# Patient Record
Sex: MALE | Race: WHITE
[De-identification: names, ages, dates, MRNs, and addresses within clinical notes are randomized per-mention and may not be internally consistent; named-entity substitution may affect disease eponyms.]

---

## 2019-02-08 ENCOUNTER — HOSPITAL ENCOUNTER (EMERGENCY)
Dept: HOSPITAL 7 - FB.ED | Age: 39
Discharge: HOME | End: 2019-02-08
Payer: COMMERCIAL

## 2019-02-08 VITALS — SYSTOLIC BLOOD PRESSURE: 158 MMHG | DIASTOLIC BLOOD PRESSURE: 76 MMHG

## 2019-02-08 DIAGNOSIS — S54.02XD: ICD-10-CM

## 2019-02-08 DIAGNOSIS — E87.6: ICD-10-CM

## 2019-02-08 DIAGNOSIS — I10: ICD-10-CM
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
## 2019-02-08 DIAGNOSIS — Z79.899: ICD-10-CM

## 2019-02-08 DIAGNOSIS — Z88.0: ICD-10-CM

## 2019-02-08 DIAGNOSIS — R55: Primary | ICD-10-CM

## 2019-02-08 DIAGNOSIS — E66.9: ICD-10-CM

## 2019-02-08 DIAGNOSIS — G47.39: ICD-10-CM

## 2019-02-08 LAB — HBA1C BLD-MCNC: 5.9 % (ref 4.5–6.2)

## 2019-02-08 PROCEDURE — 81001 URINALYSIS AUTO W/SCOPE: CPT

## 2019-02-08 PROCEDURE — 82150 ASSAY OF AMYLASE: CPT

## 2019-02-08 PROCEDURE — 70450 CT HEAD/BRAIN W/O DYE: CPT

## 2019-02-08 PROCEDURE — 84484 ASSAY OF TROPONIN QUANT: CPT

## 2019-02-08 PROCEDURE — G0480 DRUG TEST DEF 1-7 CLASSES: HCPCS

## 2019-02-08 PROCEDURE — 85610 PROTHROMBIN TIME: CPT

## 2019-02-08 PROCEDURE — 80305 DRUG TEST PRSMV DIR OPT OBS: CPT

## 2019-02-08 PROCEDURE — 80048 BASIC METABOLIC PNL TOTAL CA: CPT

## 2019-02-08 PROCEDURE — 80076 HEPATIC FUNCTION PANEL: CPT

## 2019-02-08 PROCEDURE — 85025 COMPLETE CBC W/AUTO DIFF WBC: CPT

## 2019-02-08 PROCEDURE — 82962 GLUCOSE BLOOD TEST: CPT

## 2019-02-08 PROCEDURE — 99285 EMERGENCY DEPT VISIT HI MDM: CPT

## 2019-02-08 PROCEDURE — 36415 COLL VENOUS BLD VENIPUNCTURE: CPT

## 2019-02-08 PROCEDURE — 83735 ASSAY OF MAGNESIUM: CPT

## 2019-02-08 PROCEDURE — 93005 ELECTROCARDIOGRAM TRACING: CPT

## 2019-02-08 PROCEDURE — 83036 HEMOGLOBIN GLYCOSYLATED A1C: CPT

## 2019-02-08 NOTE — EDM.PDOC
ED HPI GENERAL MEDICAL PROBLEM





- General


Stated Complaint: dizziness,facial numbness


Time Seen by Provider: 02/08/19 14:11


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


History Limitations: Reports: Other (tired)





- History of Present Illness


INITIAL COMMENTS - FREE TEXT/NARRATIVE: 





38 y.o.w.m.morbid obese -360 lbs est- came with his  to the 

ed after he nearly past out about one hour after lunch, at work. Pt stated, he 

does not have good night sleeps, because he wake up 4-5 times at night, due to 

numbness at his left arm, S/P ulnar nerve relocation at the  elbow. His 

girlfriend told him he is snoring really loud at night. He did not have a sleep 

study in the past. No trauma, his favored food is pasta, No C/P no N/V/D  No 

diaphoresis. No other acute medical issues. /74 Pulse 82 RR 17 Pulse ox 97

% on RA Temp 36.4


Onset Date: 02/08/19


Onset Time: 13:00


Duration: Minutes:, Intermittent


Location: Reports: Generalized


Quality: Reports: Other (numbness left forearm+)


Severity: Moderate


Improves with: Reports: Rest


Worsens with: Reports: Movement


Context: Reports: Other (near syncopy)


Associated Symptoms: Reports: Syncope (extremely tired, lethargic)





- Related Data


 Allergies











Allergy/AdvReac Type Severity Reaction Status Date / Time


 


Penicillins Allergy  unknown Verified 06/03/16 07:18











Home Meds: 


 Home Meds





Losartan/Hydrochlorothiazide [Losartan-HCTZ 50-12.5 MG] 1 each PO DAILY #10 

tablet 02/08/19 [Rx]











Past Medical History


HEENT History: Reports: Impaired Vision


Gastrointestinal History: Reports: GERD


Neurological History: Reports: Headaches, Chronic


Psychiatric History: Reports: Depression


Dermatologic History: Reports: Other (See Below)


Other Dermatologic History: NONTHROMBOCYTOPENIA PURPURAS





- Past Surgical History


GI Surgical History: Reports: Appendectomy





ED ROS GENERAL





- Review of Systems


Review Of Systems: See Below


Constitutional: Reports: Malaise, Weakness


HEENT: Reports: No Symptoms


Respiratory: Reports: No Symptoms


Cardiovascular: Reports: Blood Pressure Problem


Endocrine: Reports: No Symptoms


GI/Abdominal: Reports: No Symptoms


: Reports: No Symptoms


Musculoskeletal: Reports: No Symptoms


Skin: Reports: No Symptoms


Neurological: Reports: Numbness (left forearm finger 4+5)


Psychiatric: Reports: No Symptoms


Hematologic/Lymphatic: Reports: No Symptoms


Immunologic: Reports: No Symptoms





ED EXAM, NEURO





- Physical Exam


Exam: See Below


Exam Limited By: No Limitations


General Appearance: Alert, WD/WN, Mild Distress, Obese (morbid)


Eye Exam: Bilateral Eye: Normal Inspection


Ears: Normal External Exam


Nose: Normal Inspection


Throat/Mouth: Normal Inspection


Head Exam: Atraumatic, Normocephalic


Neck: Normal Inspection, Supple, Non-Tender, Full Range of Motion


Respiratory/Chest: No Respiratory Distress, Lungs Clear, Normal Breath Sounds


Cardiovascular: Normal Peripheral Pulses, Regular Rate, Rhythm, No Edema, No 

Gallop, No JVD, No Murmur, No Rub


GI/Abdominal: Normal Bowel Sounds, Soft, Non-Tender, No Organomegaly, No 

Abnormal Bruit, No Mass, Pelvis Stable


 (Male) Exam: Deferred


Rectal (Males) Exam: Deferred


Neurological: CN II-XII Intact, Normal Plantar Flexion, Oriented x 3, 

Difficulty Walking (because he is too tired)


Back Exam: Normal Inspection, Full Range of Motion


Extremities: Normal Inspection, Normal Range of Motion, Non-Tender, No Pedal 

Edema, Normal Capillary Refill


Psychiatric: Normal Affect, Normal Mood


Skin Exam: Warm, Dry, Intact, Normal Color, No Rash





EKG INTERPRETATION


EKG Date: 02/08/19


Time: 14:30


Rhythm: NSR


Rate (Beats/Min): 76


Axis: Normal


P-Wave: Present


QRS: Normal


ST-T: Normal


QT: Normal


Comparison: NA - No Prior EKG





Course





- Vital Signs


Text/Narrative:: 








38 y.o.w.m.morbid obese -360 lbs est- came with his  to the 

ed after he nearly past out about one hour after lunch, at work. Pt stated, he 

does not have good night sleeps, because he wake up 4-5 times at night, due to 

numbness at his left arm, S/P ulnar nerve relocation at the  elbow. His 

girlfriend told him he is snoring really loud at night. He did not have a sleep 

study in the past. No trauma, his favored food is pasta, No C/P no N/V/D  No 

diaphoresis. No other acute medical issues. /74 Pulse 82 RR 17 Pulse ox 97

% on RA Temp 36.4


PE:  Morbid obese w male with a postprandial near syncopal episode, feel tired, 

wakes up 4-5 times a night and has numbness at his left arm radiating to finger 

4-5


Imaging: CT head: NAD as per RAD


Labs: CBC nl BMP nl except potassium was 3.3 UDS neg ETOH < 0.03


EKG: NSR


Impression: 1) Postprandial near syntopy (Poss sleep apnea, snores, wakes up 

several times a night and is at daytime extremely tired)


                 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 

relocation left elbow with hand surgeon. 


                 3) HTN, goal to be determined


                 4) Hypokalemia


                 5) Morbid obesity (360lbs est.)


Tx: Potassium


Reexam: Pt was feeling better, walked to the bathroom, BP meds were not given 

in the  ed because his very initial BP -checked by ashley was 140/67)


Plan: D/C with instructions


Last Recorded V/S: 


 Last Vital Signs











Temp  36.4 C   02/08/19 14:13


 


Pulse  78   02/08/19 17:15


 


Resp  17   02/08/19 14:16


 


BP  158/76 H  02/08/19 17:15


 


Pulse Ox  100   02/08/19 15:00








 





Orthostatic Blood Pressure [     160/101


Standing]                        


Orthostatic Blood Pressure [     161/99


Sitting]                         


Orthostatic Blood Pressure [     160/85


Supine]                          











- Orders/Labs/Meds


Orders: 


 Active Orders 24 hr











 Category Date Time Status


 


 Orthostatic Vital Signs [RC] ASDIRECTED Care  02/08/19 15:46 Active


 


 Head wo Cont [CT] Stat Exams  02/08/19 14:35 Taken


 


 EKG 12 Lead [EK] Routine Ther  02/08/19 14:20 Ordered











Labs: 


 Laboratory Tests











  02/08/19 02/08/19 02/08/19 Range/Units





  14:23 14:40 14:40 


 


WBC   9.8   (4.5-12.0)  X10-3/uL


 


RBC   5.18   (4.30-5.75)  x10(6)uL


 


Hgb   15.0   (11.5-15.5)  g/dL


 


Hct   44.2   (30.0-51.3)  %


 


MCV   85.4   (80-96)  fL


 


MCH   29.0   (27.7-33.6)  pg


 


MCHC   33.9   (32.2-35.4)  g/dL


 


RDW   12.8   (11.5-15.5)  %


 


Plt Count   258   (125-369)  X10(3)uL


 


MPV   8.8   (7.4-10.4)  fL


 


Neut % (Auto)   68.1   (46-82)  %


 


Lymph % (Auto)   23.6   (13-37)  %


 


Mono % (Auto)   6.2   (4-12)  %


 


Eos % (Auto)   2   (1.0-5.0)  %


 


Baso % (Auto)   1   (0-2)  %


 


Neut # (Auto)   6.7   (1.6-8.3)  #


 


Lymph # (Auto)   2.3   (0.6-5.0)  #


 


Mono # (Auto)   0.6   (0.0-1.3)  #


 


Eos # (Auto)   0.2   (0.0-0.8)  #


 


Baso # (Auto)   0.0   (0.0-0.2)  #


 


PT    9.9  (8.7-11.1)  


 


INR    1.02  (0.89-1.13)  


 


Sodium     (135-145)  mmol/L


 


Potassium     (3.5-5.3)  mmol/L


 


Chloride     (100-110)  mmol/L


 


Carbon Dioxide     (21-32)  mmol/L


 


BUN     (7-18)  mg/dL


 


Creatinine     (0.70-1.30)  mg/dL


 


Est Cr Clr Drug Dosing     


 


Estimated GFR (MDRD)     (>60)  


 


BUN/Creatinine Ratio     (9-20)  


 


Glucose     ()  mg/dL


 


POC Glucose  109    ()  mg/dL


 


Hemoglobin A1c     (4.5-6.2)  %


 


Calcium     (8.6-10.2)  mg/dL


 


Magnesium     (1.8-2.5)  mg/dL


 


Total Bilirubin     (0.1-1.3)  mg/dL


 


Direct Bilirubin     (0.10-0.20)  mg/dL


 


AST     (5-25)  IU/L


 


ALT     (12-36)  U/L


 


Alkaline Phosphatase     ()  IU/L


 


Troponin I     (<0.017-0.056)  ng/mL


 


Total Protein     (6.0-8.0)  g/dL


 


Albumin     (3.5-5.2)  g/dL


 


Amylase     ()  U/L


 


Urine Color     (YELLOW)  


 


Urine Appearance     (CLEAR)  


 


Urine pH     (5.0-6.5)  


 


Ur Specific Gravity     (1.010-1.025)  


 


Urine Protein     (NEGATIVE)  mg/dL


 


Urine Glucose (UA)     (NEGATIVE)  mg/dL


 


Urine Ketones     (NEGATIVE)  mg/dL


 


Urine Occult Blood     (NEGATIVE)  


 


Urine Nitrite     (NEGATIVE)  


 


Urine Bilirubin     (NEGATIVE)  


 


Urine Urobilinogen     (NEGATIVE)  mg/dL


 


Ur Leukocyte Esterase     (NEGATIVE)  


 


Urine RBC     (0)  


 


Urine WBC     (0)  


 


Ur Squamous Epith Cells     (NS,R,O)  


 


Urine Bacteria     (NS)  


 


Urine Opiates Screen     (NEGATIVE)  


 


Ur Oxycodone Screen     (NEGATIVE)  


 


Ur Propoxyphene Screen     (NEGATIVE)  


 


Ur Barbituates Screen     (NEGATIVE)  


 


Ur Tricyclics Screen     (NEGATIVE)  


 


Ur Phencyclidine Scrn     (NEGATIVE)  


 


Ur Amphetamine Screen     (NEGATIVE)  


 


Urine MDMA Screen     (NEGATIVE)  


 


U Benzodiazepines Scrn     (NEGATIVE)  


 


U Cocaine Metab Screen     (NEGATIVE)  


 


U Marijuana (THC) Screen     (NEGATIVE)  


 


Ethyl Alcohol     (<0.03)  %














  02/08/19 02/08/19 02/08/19 Range/Units





  14:40 14:40 14:40 


 


WBC     (4.5-12.0)  X10-3/uL


 


RBC     (4.30-5.75)  x10(6)uL


 


Hgb     (11.5-15.5)  g/dL


 


Hct     (30.0-51.3)  %


 


MCV     (80-96)  fL


 


MCH     (27.7-33.6)  pg


 


MCHC     (32.2-35.4)  g/dL


 


RDW     (11.5-15.5)  %


 


Plt Count     (125-369)  X10(3)uL


 


MPV     (7.4-10.4)  fL


 


Neut % (Auto)     (46-82)  %


 


Lymph % (Auto)     (13-37)  %


 


Mono % (Auto)     (4-12)  %


 


Eos % (Auto)     (1.0-5.0)  %


 


Baso % (Auto)     (0-2)  %


 


Neut # (Auto)     (1.6-8.3)  #


 


Lymph # (Auto)     (0.6-5.0)  #


 


Mono # (Auto)     (0.0-1.3)  #


 


Eos # (Auto)     (0.0-0.8)  #


 


Baso # (Auto)     (0.0-0.2)  #


 


PT     (8.7-11.1)  


 


INR     (0.89-1.13)  


 


Sodium  139    (135-145)  mmol/L


 


Potassium  3.3 L    (3.5-5.3)  mmol/L


 


Chloride  103    (100-110)  mmol/L


 


Carbon Dioxide  28    (21-32)  mmol/L


 


BUN  14    (7-18)  mg/dL


 


Creatinine  1.1    (0.70-1.30)  mg/dL


 


Est Cr Clr Drug Dosing  TNP    


 


Estimated GFR (MDRD)  > 60    (>60)  


 


BUN/Creatinine Ratio  12.7    (9-20)  


 


Glucose  110    ()  mg/dL


 


POC Glucose     ()  mg/dL


 


Hemoglobin A1c    5.9  (4.5-6.2)  %


 


Calcium  9.0    (8.6-10.2)  mg/dL


 


Magnesium     (1.8-2.5)  mg/dL


 


Total Bilirubin     (0.1-1.3)  mg/dL


 


Direct Bilirubin     (0.10-0.20)  mg/dL


 


AST     (5-25)  IU/L


 


ALT     (12-36)  U/L


 


Alkaline Phosphatase     ()  IU/L


 


Troponin I   < 0.017 L   (<0.017-0.056)  ng/mL


 


Total Protein     (6.0-8.0)  g/dL


 


Albumin     (3.5-5.2)  g/dL


 


Amylase     ()  U/L


 


Urine Color     (YELLOW)  


 


Urine Appearance     (CLEAR)  


 


Urine pH     (5.0-6.5)  


 


Ur Specific Gravity     (1.010-1.025)  


 


Urine Protein     (NEGATIVE)  mg/dL


 


Urine Glucose (UA)     (NEGATIVE)  mg/dL


 


Urine Ketones     (NEGATIVE)  mg/dL


 


Urine Occult Blood     (NEGATIVE)  


 


Urine Nitrite     (NEGATIVE)  


 


Urine Bilirubin     (NEGATIVE)  


 


Urine Urobilinogen     (NEGATIVE)  mg/dL


 


Ur Leukocyte Esterase     (NEGATIVE)  


 


Urine RBC     (0)  


 


Urine WBC     (0)  


 


Ur Squamous Epith Cells     (NS,R,O)  


 


Urine Bacteria     (NS)  


 


Urine Opiates Screen     (NEGATIVE)  


 


Ur Oxycodone Screen     (NEGATIVE)  


 


Ur Propoxyphene Screen     (NEGATIVE)  


 


Ur Barbituates Screen     (NEGATIVE)  


 


Ur Tricyclics Screen     (NEGATIVE)  


 


Ur Phencyclidine Scrn     (NEGATIVE)  


 


Ur Amphetamine Screen     (NEGATIVE)  


 


Urine MDMA Screen     (NEGATIVE)  


 


U Benzodiazepines Scrn     (NEGATIVE)  


 


U Cocaine Metab Screen     (NEGATIVE)  


 


U Marijuana (THC) Screen     (NEGATIVE)  


 


Ethyl Alcohol     (<0.03)  %














  02/08/19 02/08/19 02/08/19 Range/Units





  14:40 14:40 15:31 


 


WBC     (4.5-12.0)  X10-3/uL


 


RBC     (4.30-5.75)  x10(6)uL


 


Hgb     (11.5-15.5)  g/dL


 


Hct     (30.0-51.3)  %


 


MCV     (80-96)  fL


 


MCH     (27.7-33.6)  pg


 


MCHC     (32.2-35.4)  g/dL


 


RDW     (11.5-15.5)  %


 


Plt Count     (125-369)  X10(3)uL


 


MPV     (7.4-10.4)  fL


 


Neut % (Auto)     (46-82)  %


 


Lymph % (Auto)     (13-37)  %


 


Mono % (Auto)     (4-12)  %


 


Eos % (Auto)     (1.0-5.0)  %


 


Baso % (Auto)     (0-2)  %


 


Neut # (Auto)     (1.6-8.3)  #


 


Lymph # (Auto)     (0.6-5.0)  #


 


Mono # (Auto)     (0.0-1.3)  #


 


Eos # (Auto)     (0.0-0.8)  #


 


Baso # (Auto)     (0.0-0.2)  #


 


PT     (8.7-11.1)  


 


INR     (0.89-1.13)  


 


Sodium     (135-145)  mmol/L


 


Potassium     (3.5-5.3)  mmol/L


 


Chloride     (100-110)  mmol/L


 


Carbon Dioxide     (21-32)  mmol/L


 


BUN     (7-18)  mg/dL


 


Creatinine     (0.70-1.30)  mg/dL


 


Est Cr Clr Drug Dosing     


 


Estimated GFR (MDRD)     (>60)  


 


BUN/Creatinine Ratio     (9-20)  


 


Glucose     ()  mg/dL


 


POC Glucose     ()  mg/dL


 


Hemoglobin A1c     (4.5-6.2)  %


 


Calcium     (8.6-10.2)  mg/dL


 


Magnesium   2.1   (1.8-2.5)  mg/dL


 


Total Bilirubin  0.5    (0.1-1.3)  mg/dL


 


Direct Bilirubin  0.10    (0.10-0.20)  mg/dL


 


AST  25    (5-25)  IU/L


 


ALT  56 H    (12-36)  U/L


 


Alkaline Phosphatase  101    ()  IU/L


 


Troponin I     (<0.017-0.056)  ng/mL


 


Total Protein  7.5    (6.0-8.0)  g/dL


 


Albumin  3.6    (3.5-5.2)  g/dL


 


Amylase  35    ()  U/L


 


Urine Color     (YELLOW)  


 


Urine Appearance     (CLEAR)  


 


Urine pH     (5.0-6.5)  


 


Ur Specific Gravity     (1.010-1.025)  


 


Urine Protein     (NEGATIVE)  mg/dL


 


Urine Glucose (UA)     (NEGATIVE)  mg/dL


 


Urine Ketones     (NEGATIVE)  mg/dL


 


Urine Occult Blood     (NEGATIVE)  


 


Urine Nitrite     (NEGATIVE)  


 


Urine Bilirubin     (NEGATIVE)  


 


Urine Urobilinogen     (NEGATIVE)  mg/dL


 


Ur Leukocyte Esterase     (NEGATIVE)  


 


Urine RBC     (0)  


 


Urine WBC     (0)  


 


Ur Squamous Epith Cells     (NS,R,O)  


 


Urine Bacteria     (NS)  


 


Urine Opiates Screen     (NEGATIVE)  


 


Ur Oxycodone Screen     (NEGATIVE)  


 


Ur Propoxyphene Screen     (NEGATIVE)  


 


Ur Barbituates Screen     (NEGATIVE)  


 


Ur Tricyclics Screen     (NEGATIVE)  


 


Ur Phencyclidine Scrn     (NEGATIVE)  


 


Ur Amphetamine Screen     (NEGATIVE)  


 


Urine MDMA Screen     (NEGATIVE)  


 


U Benzodiazepines Scrn     (NEGATIVE)  


 


U Cocaine Metab Screen     (NEGATIVE)  


 


U Marijuana (THC) Screen     (NEGATIVE)  


 


Ethyl Alcohol    < 0.03  (<0.03)  %














  02/08/19 02/08/19 Range/Units





  16:07 16:07 


 


WBC    (4.5-12.0)  X10-3/uL


 


RBC    (4.30-5.75)  x10(6)uL


 


Hgb    (11.5-15.5)  g/dL


 


Hct    (30.0-51.3)  %


 


MCV    (80-96)  fL


 


MCH    (27.7-33.6)  pg


 


MCHC    (32.2-35.4)  g/dL


 


RDW    (11.5-15.5)  %


 


Plt Count    (125-369)  X10(3)uL


 


MPV    (7.4-10.4)  fL


 


Neut % (Auto)    (46-82)  %


 


Lymph % (Auto)    (13-37)  %


 


Mono % (Auto)    (4-12)  %


 


Eos % (Auto)    (1.0-5.0)  %


 


Baso % (Auto)    (0-2)  %


 


Neut # (Auto)    (1.6-8.3)  #


 


Lymph # (Auto)    (0.6-5.0)  #


 


Mono # (Auto)    (0.0-1.3)  #


 


Eos # (Auto)    (0.0-0.8)  #


 


Baso # (Auto)    (0.0-0.2)  #


 


PT    (8.7-11.1)  


 


INR    (0.89-1.13)  


 


Sodium    (135-145)  mmol/L


 


Potassium    (3.5-5.3)  mmol/L


 


Chloride    (100-110)  mmol/L


 


Carbon Dioxide    (21-32)  mmol/L


 


BUN    (7-18)  mg/dL


 


Creatinine    (0.70-1.30)  mg/dL


 


Est Cr Clr Drug Dosing    


 


Estimated GFR (MDRD)    (>60)  


 


BUN/Creatinine Ratio    (9-20)  


 


Glucose    ()  mg/dL


 


POC Glucose    ()  mg/dL


 


Hemoglobin A1c    (4.5-6.2)  %


 


Calcium    (8.6-10.2)  mg/dL


 


Magnesium    (1.8-2.5)  mg/dL


 


Total Bilirubin    (0.1-1.3)  mg/dL


 


Direct Bilirubin    (0.10-0.20)  mg/dL


 


AST    (5-25)  IU/L


 


ALT    (12-36)  U/L


 


Alkaline Phosphatase    ()  IU/L


 


Troponin I    (<0.017-0.056)  ng/mL


 


Total Protein    (6.0-8.0)  g/dL


 


Albumin    (3.5-5.2)  g/dL


 


Amylase    ()  U/L


 


Urine Color  Yellow   (YELLOW)  


 


Urine Appearance  Clear   (CLEAR)  


 


Urine pH  8.0 H   (5.0-6.5)  


 


Ur Specific Gravity  1.015   (1.010-1.025)  


 


Urine Protein  Negative   (NEGATIVE)  mg/dL


 


Urine Glucose (UA)  Normal   (NEGATIVE)  mg/dL


 


Urine Ketones  Negative   (NEGATIVE)  mg/dL


 


Urine Occult Blood  Negative   (NEGATIVE)  


 


Urine Nitrite  Negative   (NEGATIVE)  


 


Urine Bilirubin  Negative   (NEGATIVE)  


 


Urine Urobilinogen  Normal   (NEGATIVE)  mg/dL


 


Ur Leukocyte Esterase  Negative   (NEGATIVE)  


 


Urine RBC  Not seen   (0)  


 


Urine WBC  0-5   (0)  


 


Ur Squamous Epith Cells  Rare   (NS,R,O)  


 


Urine Bacteria  Few H   (NS)  


 


Urine Opiates Screen   Negative  (NEGATIVE)  


 


Ur Oxycodone Screen   Negative  (NEGATIVE)  


 


Ur Propoxyphene Screen   Negative  (NEGATIVE)  


 


Ur Barbituates Screen   Negative  (NEGATIVE)  


 


Ur Tricyclics Screen   Negative  (NEGATIVE)  


 


Ur Phencyclidine Scrn   Negative  (NEGATIVE)  


 


Ur Amphetamine Screen   Negative  (NEGATIVE)  


 


Urine MDMA Screen   Negative  (NEGATIVE)  


 


U Benzodiazepines Scrn   Negative  (NEGATIVE)  


 


U Cocaine Metab Screen   Negative  (NEGATIVE)  


 


U Marijuana (THC) Screen   Negative  (NEGATIVE)  


 


Ethyl Alcohol    (<0.03)  %











Meds: 


Medications














Discontinued Medications














Generic Name Dose Route Start Last Admin





  Trade Name Freq  PRN Reason Stop Dose Admin


 


Potassium Chloride  40 meq  02/08/19 17:26  02/08/19 17:38





  Klor-Con M20  PO  02/08/19 17:27  40 meq





  ONETIME ONE   Administration





     





     





     





     














Departure





- Departure


Time of Disposition: 17:05


Disposition: Home, Self-Care 01


Condition: Good (HTN)


Clinical Impression: 


 HTN (hypertension) with goal to be determined, Obesity, Vasovagal near syncope

, Hypokalemia





Neuropraxia of left ulnar nerve


Qualifiers:


 Encounter type: subsequent encounter Qualified Code(s): S54.02XD - Injury of 

ulnar nerve at forearm level, left arm, subsequent encounter





Sleep apnea syndrome


Qualifiers:


 Sleep apnea type: other type Qualified Code(s): G47.39 - Other sleep apnea








- Discharge Information


Prescriptions: 


Losartan/Hydrochlorothiazide [Losartan-HCTZ 50-12.5 MG] 1 each PO DAILY #10 

tablet


Instructions:  Near-Syncope, Easy-to-Read, Hypertension, Easy-to-Read


Referrals: 


Patrick Tristan MD [Primary Care Provider] - 


Forms:  ED Department Discharge


Additional Instructions: 


Please follow up with your PMD/Neurologist regarding your numbness at your left 

arm. A sleep study may be necessary regarding your  sleepiness during daytime, 

your SBP was above 160 since you were in the ED, which needs urgent evaluation/

treatment. Please come back if your symptoms get worse acutely





- My Orders


Last 24 Hours: 


My Active Orders





02/08/19 14:20


EKG 12 Lead [EK] Routine 





02/08/19 14:35


Head wo Cont [CT] Stat 





02/08/19 15:46


Orthostatic Vital Signs [RC] ASDIRECTED 














- Assessment/Plan


Last 24 Hours: 


My Active Orders





02/08/19 14:20


EKG 12 Lead [EK] Routine 





02/08/19 14:35


Head wo Cont [CT] Stat 





02/08/19 15:46


Orthostatic Vital Signs [RC] ASDIRECTED

## 2019-02-11 NOTE — CT
INDICATION:  Trauma, numbness left side of face.



CT HEAD WITHOUT CONTRAST:  Spiral examination of the brain was obtained with 
sagittal and coronal reconstructions, 02/08/19 - no comparisons.  Total exam 
DLP = 738.69 mGy-cm.



There is a tiny retention cyst along the cranial aspect of the right maxillary 
antrum.  The paranasal sinuses and mastoid air cells were otherwise 
unremarkable and well-aerated.



No cranial abnormality was suggested.



No shift of midline structures, ventricular abnormalities, or abnormal areas of 
density were identified - no bleeding site or hematoma was seen.



IMPRESSION:  No acute intracranial abnormality - normal CT brain.



Report was called to Dr. Rhodes at 1532 hours on 02/09/19.

[facility]